# Patient Record
Sex: FEMALE | Race: WHITE | Employment: FULL TIME | URBAN - METROPOLITAN AREA
[De-identification: names, ages, dates, MRNs, and addresses within clinical notes are randomized per-mention and may not be internally consistent; named-entity substitution may affect disease eponyms.]

---

## 2018-05-17 PROBLEM — F98.8 ATTENTION DEFICIT DISORDER (ADD) IN ADULT: Status: ACTIVE | Noted: 2018-05-17

## 2022-05-13 ENCOUNTER — APPOINTMENT (OUTPATIENT)
Dept: OTHER | Facility: HOSPITAL | Age: 27
End: 2022-05-13
Attending: PREVENTIVE MEDICINE

## 2023-10-25 NOTE — H&P
[id number] [facility] Patient Status:  Outpatient    1995 MRN [id number]   Location [address]  Attending Kervin Constantino MD   Hosp Day # 0 PCP Unknown Pcp     Date of Admission:  10/25/2023      HPI:   Alice Serna is a 25year old  female, current EGA of 39w1d with an estimated date of delivery of: 10/31/2023, by Last Menstrual Period who presents due to  elevated BP in the office. Being admitted for induction of labor. Pt denies N/V/F/C/CP/SOB, HA, blurry vision, dizziness, RUQ pain, ctx, lof, VB. Pt was seen in the office today and was found to have /80-->repeat 160/98. She had elevated BP back at 28 weeks sandra 172/100 and at 30 weeks 160/92. Since she has had two BP that have been elevated >6 hrs apart, she is considered a GHTN. Her current obstetrical history is significant for ADHD, depression, Rh negative (received Rhogam 23) and GHTN. .        Patient Active Problem List:     Attention deficit disorder (ADD) in adult     Pregnancy        Fetal Movement reported as good. GBS negative. Rh negative. History   Obstetric History:   OB History    Para Term  AB Living   1 0 0 0 0     SAB IAB Ectopic Multiple Live Births   0 0 0          # Outcome Date GA Lbr Clifford/2nd Weight Sex Delivery Anes PTL Lv   1 Current                Gyne History:   Last pap smear: 3/24/2023: Negative cytology    Past Medical History:   Past Medical History:   Diagnosis Date    IUD (intrauterine device) in place 2018    Mirena-approimate date of placement (April-2018) in [facility])       Past Surgerical History:   Past Surgical History:   Procedure Laterality Date    WISDOM TEETH REMOVED         Social History: Social History    Tobacco Use      Smoking status: Not on file      Smokeless tobacco: Never    Alcohol use: Yes      Comment: occasionally       Allergies/Medications:    Allergies:     Latex HIVES  Latex                   HIVES  Sulfa Antibiotics       HIVES  Sulfa Antibiotics       HIVES    Comment:Systemic    Medications:  prenatal vitamin with DHA 27-0.8-228 MG Oral Cap, Take 1 capsule by mouth daily. , Disp: , Rfl:   docusate sodium 100 MG Oral Cap, Take 1 capsule (100 mg total) by mouth 2 (two) times daily. , Disp: , Rfl:   buPROPion HCl (WELLBUTRIN OR), Take by mouth., Disp: , Rfl:           Review of Systems:   As documented in HPI      Physical Exam:   Pulse:  [62-74] 71  BP: (122-130)/(72-80) 122/77    Constitutional: alert and cooperative in No distress    Abdomen: soft,  nontender, gravid    Vaginal exam: Dilation:closed     Effacement: thick %    Station: high    FHT assessment:   Baseline: 120 bpm   Variability: moderate   Accels:  present   Decels: No   Tocos:  irregular   Category: 1 tracing    Neurologic: Alert and oriented  Psychiatric: Cooperative    Results:     Recent Results (from the past 24 hour(s))   Comp Metabolic Panel (14)    Collection Time: 10/25/23  3:09 PM   Result Value Ref Range    Glucose 76 70 - 99 mg/dL    Sodium 138 136 - 145 mmol/L    Potassium 4.1 3.5 - 5.1 mmol/L    Chloride 107 98 - 112 mmol/L    CO2 23.0 21.0 - 32.0 mmol/L    Anion Gap 8 0 - 18 mmol/L    BUN 8 7 - 18 mg/dL    Creatinine 0.87 0.55 - 1.02 mg/dL    BUN/CREA Ratio 9.2 (L) 10.0 - 20.0    Calcium, Total 9.3 8.5 - 10.1 mg/dL    Calculated Osmolality 283 275 - 295 mOsm/kg    eGFR-Cr 92 >=60 mL/min/1.73m2    ALT 24 13 - 56 U/L    AST 25 15 - 37 U/L    Alkaline Phosphatase 129 (H) 37 - 98 U/L    Bilirubin, Total 0.3 0.1 - 2.0 mg/dL    Total Protein 6.3 (L) 6.4 - 8.2 g/dL    Albumin 2.8 (L) 3.4 - 5.0 g/dL    Globulin  3.5 2.8 - 4.4 g/dL    A/G Ratio 0.8 (L) 1.0 - 2.0    Patient Fasting for CMP?  No    Protein/Creatinine Ratio, Urine Random    Collection Time: 10/25/23  3:09 PM   Result Value Ref Range    Total Protein Urine Random <5.0 mg/dL    Creatinine Ur Random 20.00 mg/dL    Urine Protein/Creatinine Ratio, Random     CBC W/ DIFFERENTIAL    Collection Time: 10/25/23  3:09 PM   Result Value Ref Range    WBC 11.6 (H) 4.0 - 11.0 x10(3) uL    RBC 4.14 3.80 - 5.30 x10(6)uL    HGB 12.4 12.0 - 16.0 g/dL    HCT 36.0 35.0 - 48.0 %    MCV 87.0 80.0 - 100.0 fL    MCH 30.0 26.0 - 34.0 pg    MCHC 34.4 31.0 - 37.0 g/dL    RDW-SD 46.8 (H) 35.1 - 46.3 fL    RDW 14.9 11.0 - 15.0 %    .0 150.0 - 450.0 10(3)uL    Neutrophil Absolute Prelim 8.49 (H) 1.50 - 7.70 x10 (3) uL    Neutrophil Absolute 8.49 (H) 1.50 - 7.70 x10(3) uL    Lymphocyte Absolute 2.01 1.00 - 4.00 x10(3) uL    Monocyte Absolute 0.76 0.10 - 1.00 x10(3) uL    Eosinophil Absolute 0.09 0.00 - 0.70 x10(3) uL    Basophil Absolute 0.03 0.00 - 0.20 x10(3) uL    Immature Granulocyte Absolute 0.19 0.00 - 1.00 x10(3) uL    Neutrophil % 73.3 %    Lymphocyte % 17.4 %    Monocyte % 6.6 %    Eosinophil % 0.8 %    Basophil % 0.3 %    Immature Granulocyte % 1.6 %       No results found. Assessment/Plan:   IUP 39w1d admitted  for IOL    Obstetrical history significant for ADHD, depression, Rh negative (received Rhogam 23) and GHTN      Patient Active Problem List:     Attention deficit disorder (ADD) in adult     Pregnancy        Treatment Plan:  Admit for IOL    Panda Michelle is a 25year old  female, current EGA of 39w1d who presents for admission due to IOL    Risks, benefits, alternatives and possible complications have been discussed in detail with the patient. Pre-admission, admission, and post admission procedures and expectations were discussed in detail.     All questions answered; all appropriate consents will be signed at the [place] [id number] at 39w1d  Fetal heart tones category 1  IOL:  admit, routine labs, Cervix unfavorable; Cook's balloon for cervical ripening, epidural if patient desires  GBS negative  GHTN: labs overall wnl; no preeclamptic symptoms; since pt is 39 weeks and GHTN will proceed with IOL  CPM      Edi Petit Maddy De Leon MD  10/25/2023  4:07 PM

## 2023-10-25 NOTE — PROGRESS NOTES
Pt is a 25year old female admitted to TR4/TR4-A. Patient presents with:  R/o Preeclampsia: Pt sent from office for elevated BP's     Pt is  39w1d intra-uterine pregnancy. History obtained, consents signed. Oriented to room, staff, and plan of care.

## 2023-10-26 NOTE — PROGRESS NOTES
Limited OB Ultrasound ([id number])    FACILITY: [facility]  EXAMINATION DATE: 10/25/2023     Indication: position check  EGA: 39w1d     Findings  Number of gestations: navarrete  Presentation:  cephalic, OP  Fetal Cardiac Activity: present, 120 bpm  Placenta: posterior  FATOU: subjectively normal  Movement: Gross and fine movement visualized    Impression  Navarrete IUP in the cephalic position    Performed and Read By: Mercer Favia, MD Cooks Balloon Placement:  Discussed risks, benefits, and alternatives of induction of labor. Discussed methods used to induce pt into labor. Discussed induction of labor using Davis bulb balloon and how it works. Pt verbalized understanding. Pt was placed in lithotomy position with legs in stir-ups. speculum was placed in the introitus. The cervix was visualized. The Davis bulb catheter was placed per protocol. It slid in smoothly without resistance. The balloon was filled with 60ml of normal saline. Pt tolerated the procedure well. No complication were noted.

## 2023-10-26 NOTE — PLAN OF CARE
Problem: BIRTH - VAGINAL/ SECTION  Goal: Fetal and maternal status remain reassuring during the birth process  Description: INTERVENTIONS:  - Monitor vital signs  - Monitor fetal heart rate  - Monitor uterine activity  - Monitor labor progression (vaginal delivery)  - DVT prophylaxis (C/S delivery)  - Surgical antibiotic prophylaxis (C/S delivery)  Outcome: Progressing     Problem: PAIN - ADULT  Goal: Verbalizes/displays adequate comfort level or patient's stated pain goal  Description: INTERVENTIONS:  - Encourage pt to monitor pain and request assistance  - Assess pain using appropriate pain scale  - Administer analgesics based on type and severity of pain and evaluate response  - Implement non-pharmacological measures as appropriate and evaluate response  - Consider cultural and social influences on pain and pain management  - Manage/alleviate anxiety  - Utilize distraction and/or relaxation techniques  - Monitor for opioid side effects  - Notify MD/LIP if interventions unsuccessful or patient reports new pain  - Anticipate increased pain with activity and pre-medicate as appropriate  Outcome: Progressing     Problem: ANXIETY  Goal: Will report anxiety at manageable levels  Description: INTERVENTIONS:  - Administer medication as ordered  - Teach and rehearse alternative coping skills  - Provide emotional support with 1:1 interaction with staff  Outcome: Progressing     Problem: Patient Centered Care  Goal: Patient preferences are identified and integrated in the patient's plan of care  Description: Interventions:  - What would you like us to know as we care for you?  We're having a baby girl!  - Provide timely, complete, and accurate information to patient/family  - Incorporate patient and family knowledge, values, beliefs, and cultural backgrounds into the planning and delivery of care  - Encourage patient/family to participate in care and decision-making at the level they choose  - [place] patient and family perspectives and choices  Outcome: Progressing     Problem: Patient/Family Goals  Goal: Patient/Family Long Term Goal  Description: Patient's Long Term Goal: Maintain pregnancy    Interventions:  - Multidisciplinary approach to provide holistic care  - Provide disease specific education and reinforcement throughout hospitalization     Outcome: Progressing  Goal: Patient/Family Short Term Goal  Description: Patient's Short Term Goal: Understand anticipatory needs of  at this gestational age    Interventions:   - Neonatology consult   - Lactation consult    Outcome: Progressing

## 2023-10-27 NOTE — DISCHARGE SUMMARY
Admit dx: IUP 39 weeks, 1 days, elevated blood pressure in office, 10/25/23    Discharge dx: IUP 39 weeks, 3 days, normotensive, 10/27/23    Hospital course:  Patient admitted following elevated blood pressure in the office. She underwent induction of labor with placement of Davis balloon followed by pitocin. Cervix remained unfavorable, unable to safely perform artificial rupture of membranes. After two days of pitocin, situation reassessed. Please refer to progress note. As patient remained largely normotensive during her hospital stay, the necessity of induction of labor was reevaluated. Patient presented options. Patient opted for discharge home with close follow up. Patient with follow up appointment 10/30/23.

## 2023-10-27 NOTE — PLAN OF CARE
Problem: BIRTH - VAGINAL/ SECTION  Goal: Fetal and maternal status remain reassuring during the birth process  Description: INTERVENTIONS:  - Monitor vital signs  - Monitor fetal heart rate  - Monitor uterine activity  - Monitor labor progression (vaginal delivery)  - DVT prophylaxis (C/S delivery)  - Surgical antibiotic prophylaxis (C/S delivery)  Outcome: Progressing     Problem: PAIN - ADULT  Goal: Verbalizes/displays adequate comfort level or patient's stated pain goal  Description: INTERVENTIONS:  - Encourage pt to monitor pain and request assistance  - Assess pain using appropriate pain scale  - Administer analgesics based on type and severity of pain and evaluate response  - Implement non-pharmacological measures as appropriate and evaluate response  - Consider cultural and social influences on pain and pain management  - Manage/alleviate anxiety  - Utilize distraction and/or relaxation techniques  - Monitor for opioid side effects  - Notify MD/LIP if interventions unsuccessful or patient reports new pain  - Anticipate increased pain with activity and pre-medicate as appropriate  Outcome: Progressing     Problem: ANXIETY  Goal: Will report anxiety at manageable levels  Description: INTERVENTIONS:  - Administer medication as ordered  - Teach and rehearse alternative coping skills  - Provide emotional support with 1:1 interaction with staff  Outcome: Progressing     Problem: Patient Centered Care  Goal: Patient preferences are identified and integrated in the patient's plan of care  Description: Interventions:  - What would you like us to know as we care for you?  We're having a baby girl!  - Provide timely, complete, and accurate information to patient/family  - Incorporate patient and family knowledge, values, beliefs, and cultural backgrounds into the planning and delivery of care  - Encourage patient/family to participate in care and decision-making at the level they choose  - [place] patient and family perspectives and choices  Outcome: Progressing     Problem: Patient/Family Goals  Goal: Patient/Family Long Term Goal  Description: Patient's Long Term Goal: Maintain pregnancy    Interventions:  - Multidisciplinary approach to provide holistic care  - Provide disease specific education and reinforcement throughout hospitalization     Outcome: Progressing  Goal: Patient/Family Short Term Goal  Description: Patient's Short Term Goal: Understand anticipatory needs of  at this gestational age    Interventions:   - Neonatology consult   - Lactation consult    Outcome: Progressing

## 2023-10-27 NOTE — PROGRESS NOTES
Patient was seen earlier in the day, around 11am (see nursing note for exact time). SVE at that time 2 cm (at best)/ 70%/ -3, posterior  Unable to perform AROM secondary to the cervical opening being quite posterior and the presenting part being high in the pelvis (though engaged). Patient seen and reassessed her case. Patient now on 20 mIU/mL of pitocin, having some contractions, but patient still quite comfortable, clinically not in labor. Reviewed her blood pressures since admission. All but two of her blood pressures have been <140/90. She had a BP of 144/102 on 10/25/23 at 1630. At that time, patient states that she was anxious as she had just been told that she was being induced. She also had a BP on 10/26/23 at 1730 of 157/86. Patient states that that blood pressure was taken when patient had just returned from a brisk walk around the unit and was bouncing on a birthing ball. The patient denies headache, visual changes, or upper abdominal pain. Her preeclampsia labs were not suggestive of preeclampsia. Given the following:  - her induction was started secondary to elevated blood pressure  - her blood pressures have largely been normal except for two readings (both with extenuating circumstances that could explain elevated blood pressure)  - despite methods to ripen cervix and put patient in labor, she has not been in labor  - said methods to put patient in labor carry risks of failed induction and ultimate delivery by     Weighing risks of continuing the pregnancy vs continuing induction of labor, it's not clear that the patient has gestational hypertension and it's not clear that the patient needs to undergo induction of labor. Presented options to patient: continuing with induction of labor vs discharge home with home BP monitoring and close follow up. After discussing her case and the considerations in her case, patient has opted for discharge home.   As she had instrumentation of her cervix, will have patient start ampicillin 500 mg tid x five days. Will also have patient monitor blood pressure at home 3-4 times/day, reviewed BP parameters for which she should come back to L&D. Reviewed fetal kick counts, reviewed symptoms of uterine infection. Barring an indication for patient to be delivered, patient scheduled for follow up in three days on Monday, Oct 30th. Patient expressed understanding of above discussion. Fetal well being confirmed with category one NST. Vitals rechecked. Patient was discharged home.

## 2023-11-02 NOTE — L&D DELIVERY NOTE
Nakia Hauser, Girl [[id number]]      Labor Events     labor?: No   steroids?: None  Antibiotics received during labor?: No  Rupture date/time: 2023 0825     Rupture type: AROM  Fluid color: Clear  Labor type: Induced Onset of Labor  Induction: Cervical Ripening Balloon, Oxytocin, AROM  Indications for induction: Gestational Hypertension  Intrapartum & labor complications: None       Labor Length    1st stage: 3h 50m  2nd stage: 4h 10m  3rd stage: 0h 03m       Labor Event Times    Labor onset date/time: 2023 0910  Dilation complete date/time: 2023 1300  Start pushing date/time: 2023 1305       [place] Presentation    Presentation: Vertex  Position: Left Occiput Anterior       Operative Delivery    Operative Vaginal Delivery: No                      Shoulder Dystocia    Shoulder Dystocia: No             Anesthesia    Method: Epidural              [place] Delivery      Head delivery date/time: 2023 17:09:58   Delivery date/time:  23 17:10:12   Delivery type: Normal spontaneous vaginal delivery    Details:     Delivery location: delivery room  Delivery Room Temperature: 70       Delivery Providers    Delivering Clinician: Debbie Katz MD   Delivery personnel:  Provider Role   Demario Rush, RN Baby Nurse   Sergio Titus, RN Delivery Nurse   Chris Tavarez, RN Delivery Nurse             Cord    Vessels: 3 Vessels  Complications: None  Timed cord clamping: Yes  Time in sec: 60  Cord blood disposition: to lab  Gases sent?: No       Resuscitation    Method: None       [place] Measurements      Weight: 3700 g 8 lb 2.5 oz Length: 55.2 cm     Head circum. : 35 cm                      Placenta    Date/time: 2023 1714  Removal: Spontaneous  Appearance: Intact  Disposition: Discarded       Apgars    Living status: Living   Apgar Scoring Key:    0 1 2    Skin color Blue or pale Acrocyanotic Completely pink    Heart rate Absent <100 bpm >100 bpm    Reflex irritability No response Grimace Cry or active withdrawal    Muscle tone Limp Some flexion Active motion    Respiratory effort Absent Weak cry; hypoventilation Good, crying              1 Minute:  5 Minute:  10 Minute:  15 Minute:  20 Minute:      Skin color: 0  1       Heart rate: 2  2       Reflex irritablity: 2  2       Muscle tone: 2  2       Respiratory effort: 2  2       Total: 8  9               Skin to Skin    Skin to skin initiated date/time: 2023 1730  Skin to skin with:  Mother       Vaginal Count    Initial count RN: Royal Sampson RN  Initial count Tech: Volobuyev, Ilona   Sponges   Sharps    Initial counts 10   0    Final counts 10   3    Final count RN: Royal Sampson RN  Final count MD: Elo Serna MD       Delivery (Maternal)    Episiotomy: None  Perineal lacerations: 2nd Repaired?: Yes     Vaginal laceration?: No      Cervical laceration?: No    Clitoral laceration?: No    Quantitative blood loss (mL): [address]    Vaginal Delivery Note    You Smalls Patient Status:  Inpatient    1995 MRN [id number]   Location [address] [place] [address] Attending Celia Vogt MD   Hosp Day # 1 PCP Unknown Pcp     Delivery     Infant  Date of Delivery: 2023   Time of Delivery: 5:10 PM  Delivery Type: Normal spontaneous vaginal delivery    Infant Sex/Birthweight: female 8 lb 2.5 oz (3.7 kg)     Presentation Vertex [1]  Position Left [1] Occiput [1] Anterior [1]    Apgars:  1 minute: 8               5 minutes: 9                        10 minutes:      Placenta  Date/Time of Delivery: 2023  5:14 PM   Delivery: spontaneous  Placenta to Pathology: no  Cord Gases Submitted: no  Cord Blood Collection: no  Cord Tissue Collection: no  Cord Complications: none  Sponge and Needle Counts:  Verified yes    Maternal Anesthesia: epidural   Episiotomy/Laceration Repair  Episiotomy: none  Laceration: perineal 2 degree  Location: left  Suture Size/Type: 2-0 Chromic and 3-0 Chromic    Delivery Complications  none    Neonatologist Present: no  Delivery Comment: Patient complete and pushing in LDR with epidural.  Delivered JERMAIN head over intact perineum. No nuchal cord. Body delivered and placed on maternal abdomen. Cord clamped x2 and cut by father.  of intact 3v cord and placenta. Laceration repaired in routine manner. Cervix and rectum intact.         Intake/Output   Quantitative Blood Loss (mL) 500      Gwedgar Bautista MD   2023  5:32 PM

## 2023-11-02 NOTE — ANESTHESIA PROCEDURE NOTES
Labor Analgesia    Date/Time: 11/2/2023 9:48 AM    Performed by: John Sepulveda MD  Authorized by: John Sepulveda MD      General Information and Staff    Start Time:  11/2/2023 9:48 AM  End Time:  11/2/2023 10:04 AM  Anesthesiologist:  John Sepulveda MD  Performed by:   Anesthesiologist  Patient Location:  OB  Reason for Block: labor epidural    Preanesthetic Checklist: patient identified, IV checked, site marked, risks and benefits discussed, monitors and equipment checked, pre-op evaluation, timeout performed, anesthesia consent and sterile technique used      Procedure Details    Patient Position:  Sitting  Prep: ChloraPrep    Monitoring:  Heart rate  Approach:  Midline    Epidural Needle    Injection Technique:  CAITLYN air  Needle Type:  Tuohy  Needle Gauge:  18 G  Needle Length:  3.5 in  Location:  L2-3    Spinal Needle      Catheter    Catheter Type:  Multi-orifice  Catheter Size:  20 G  Test Dose:  Negative    Assessment    Sensory Level:  T6    Additional Comments     First pass motor intact

## 2023-11-02 NOTE — PROGRESS NOTES
[place] cervical balloon placement:    After reviewing procedure with patient, a speculum exam was performed, and the cervix was clearly visualized. The cervix was prepped with betadine. The [facility] cervical balloon was guided through the cervix into the uterine cavity. Once the uterine balloon was past the internal cervical os, the stylet was removed, and the catheter was further advanced until the vaginal balloon was past the external cervical os. The uterine balloon was inflated with 20 mL of saline. Gentle traction was placed on the catheter until the vaginal balloon was visible beyond the external cervical os. The speculum was removed, and the vaginal balloon was inflated with 40 mL of saline. Finally, both the uterine and vaginal balloons were inflated with saline until both balloons contained 80 mL of saline. Patient tolerated the procedure well.

## 2023-11-02 NOTE — PLAN OF CARE
Problem: BIRTH - VAGINAL/ SECTION  Goal: Fetal and maternal status remain reassuring during the birth process  Description: INTERVENTIONS:  - Monitor vital signs  - Monitor fetal heart rate  - Monitor uterine activity  - Monitor labor progression (vaginal delivery)  - DVT prophylaxis (C/S delivery)  - Surgical antibiotic prophylaxis (C/S delivery)  Outcome: Progressing     Problem: PAIN - ADULT  Goal: Verbalizes/displays adequate comfort level or patient's stated pain goal  Description: INTERVENTIONS:  - Encourage pt to monitor pain and request assistance  - Assess pain using appropriate pain scale  - Administer analgesics based on type and severity of pain and evaluate response  - Implement non-pharmacological measures as appropriate and evaluate response  - Consider cultural and social influences on pain and pain management  - Manage/alleviate anxiety  - Utilize distraction and/or relaxation techniques  - Monitor for opioid side effects  - Notify MD/LIP if interventions unsuccessful or patient reports new pain  - Anticipate increased pain with activity and pre-medicate as appropriate  Outcome: Progressing     Problem: ANXIETY  Goal: Will report anxiety at manageable levels  Description: INTERVENTIONS:  - Administer medication as ordered  - Teach and rehearse alternative coping skills  - Provide emotional support with 1:1 interaction with staff  Outcome: Progressing     Problem: Patient/Family Goals  Goal: Patient/Family Long Term Goal  Description: Patient's Long Term Goal: Patient state she would like to have a healthy and safe delivery. Interventions:  - Involved in plan of care. - Timely nursing assessment and education.  - See additional Care Plan goals for specific interventions  Outcome: Progressing  Goal: Patient/Family Short Term Goal  Description: Patient's Short Term Goal: Pt states she would like to have a success induction and delivery a baby girl.     Interventions:   - Timely nursing assessment   - Nursing assessment and education  - Pain management education  - See additional Care Plan goals for specific interventions  Outcome: Progressing     Problem: Patient Centered Care  Goal: Patient preferences are identified and integrated in the patient's plan of care  Description: Interventions:  - What would you like us to know as we care for you?  Patient states she would like to have a successful induction and deliver a healthy baby girl.   - Provide timely, complete, and accurate information to patient/family  - Incorporate patient and family knowledge, values, beliefs, and cultural backgrounds into the planning and delivery of care  - Encourage patient/family to participate in care and decision-making at the level they choose  - Honor patient and family perspectives and choices  Outcome: Progressing

## 2023-11-02 NOTE — PROGRESS NOTES
Pt is a 25year old female admitted to [place] [address]. Patient presents with:  Scheduled Induction: Term, elevated BP. Pt is  40w1d intra-uterine pregnancy. History obtained, consents signed. Oriented to room, staff, and plan of care.

## 2023-11-02 NOTE — H&P
[id number] [facility] Patient Status:  Inpatient    1995 MRN [id number]   Location [address] Attending Fallon Dominguez MD   [facility] Day # 0 PCP Unknown Pcp     Date of Admission:  2023      HPI:   Марина Goss is a 25year old  female, current EGA of 40w1d with an estimated date of delivery of: Estimated Date of Delivery: 10/31/23      Марина Goss is being admitted for induction of labor. Her current obstetrical history is significant for Rh Negative, gestational hypertension . Patient reports no complaints . Fetal Movement: normal.     History   Obstetric History:   OB History    Para Term  AB Living   1 0 0 0 0     SAB IAB Ectopic Multiple Live Births   0 0 0          # Outcome Date GA Lbr Clifford/2nd Weight Sex Delivery Anes PTL Lv   1 Current              Past Medical History:   Past Medical History:   Diagnosis Date    IUD (intrauterine device) in place 2018    Mirena-approimate date of placement (April-2018) in [facility] ([facility])     Past Social History:   Past Surgical History:   Procedure Laterality Date    WISDOM TEETH REMOVED       Family History:   Family History   Problem Relation Age of Onset    Other (pcos) Mother     Other (Other) Sister         PCOS    Heart Disease Father     Heart Attack Father      Social History: Social History    Tobacco Use      Smoking status: Former        Types: Cigarettes        Quit date: 2016        Years since quittin.8      Smokeless tobacco: Never    Alcohol use: Not Currently      Comment: occasionally       Allergies/Medications: Allergies:     Latex                   HIVES  Latex                   HIVES  Sulfa Antibiotics       HIVES  Sulfa Antibiotics       HIVES    Comment:Systemic  Medications:  prenatal vitamin with DHA 27-0.8-228 MG Oral Cap, Take 1 capsule by mouth daily. , Disp: , Rfl:         Review of Systems:   As documented in HPI    no complaints    Physical Exam:   Pulse:  [69-80] 69  BP: (146-148)/(81-90) 146/81    Constitutional: alert, appears stated age, and cooperative  Respiratory:  nonlabored  Cardiac: regular rate and rhythm  Abdomen: soft, nontender, nondistended, no abnormal masses, no epigastric pain and FHT present  Fetal Surveillance:  140 BPM; Fetal heart variability: moderate  Sterile vaginal exam:  1.5 cm/ 50%/ -3  Cervix: no lesions or cervical motion tenderness    Results:     Lab Results   Component Value Date    TREPONEMALAB Nonreactive 08/08/2023    RAPIDHIVSCRN Negative 08/08/2023    ABO A 10/25/2023    RH Negative 10/25/2023    WBC 11.6 (H) 10/25/2023    HGB 12.4 10/25/2023    HCT 36.0 10/25/2023    .0 10/25/2023    CREATSERUM 0.87 10/25/2023    BUN 8 10/25/2023     10/25/2023    K 4.1 10/25/2023     10/25/2023    CO2 23.0 10/25/2023    GLU 76 10/25/2023    CA 9.3 10/25/2023    ALB 2.8 (L) 10/25/2023    ALKPHO 129 (H) 10/25/2023    BILT 0.3 10/25/2023    TP 6.3 (L) 10/25/2023    AST 25 10/25/2023    ALT 24 10/25/2023       No results found for: \"DDIMER\", \"ESRML\", \"ESRPF\", \"CRP\", \"BNP\", \"MG\", \"PHOS\", \"TROP\", \"TROPHS\", \"CK\", \"CKMB\", \"ASH\", \"RPR\", \"B12\", \"ETOH\", \"COLORUR\", \"CLARITY\", \"SPECGRAVITY\", \"PROUR\", \"GLUUR\", \"KETUR\", \"BILUR\", \"BLOODURINE\", \"NITRITE\", \"UROBILINOGEN\", \"LEUUR\", \"UASA\", \"POCGLU\", \"BLDCUL\", \"BLDSMR\"    No results found. Assessment/Plan:    Federico Woodard is at an estimated gestational age of 44w3d with an estimated date of delivery of:  Estimated Date of Delivery: 10/31/23    Not in labor. Admission problem(s):    Pregnancy  Gestational hypertension    Plan placement of Cook cervical balloon        Risks, benefits, alternatives and possible complications have been discussed in detail with the patient. Pre-admission, admission, and post admission procedures and expectations were discussed in detail.     All questions answered, all appropriate consents will be signed at the Hospital. Admission is planned for today. Intervention: IV Pitocin induction.     Betty Ochoa MD  11/1/2023  9:17 PM

## 2023-11-03 NOTE — PROGRESS NOTES
Received patient from L&D RN, Vicky Martines, via wheelchair. ID bands verified and unit orientation discussed and plan of care agreed on. Vitals are stable and bleeding WNL. Patient is breastfeeding only at this time and breastfeeding well. All questions answered. Family at the bedside.

## 2023-11-03 NOTE — PLAN OF CARE
Problem: BIRTH - VAGINAL/ SECTION  Goal: Fetal and maternal status remain reassuring during the birth process  Description: INTERVENTIONS:  - Monitor vital signs  - Monitor fetal heart rate  - Monitor uterine activity  - Monitor labor progression (vaginal delivery)  - DVT prophylaxis (C/S delivery)  - Surgical antibiotic prophylaxis (C/S delivery)  Outcome: Progressing     Problem: PAIN - ADULT  Goal: Verbalizes/displays adequate comfort level or patient's stated pain goal  Description: INTERVENTIONS:  - Encourage pt to monitor pain and request assistance  - Assess pain using appropriate pain scale  - Administer analgesics based on type and severity of pain and evaluate response  - Implement non-pharmacological measures as appropriate and evaluate response  - Consider cultural and social influences on pain and pain management  - Manage/alleviate anxiety  - Utilize distraction and/or relaxation techniques  - Monitor for opioid side effects  - Notify MD/LIP if interventions unsuccessful or patient reports new pain  - Anticipate increased pain with activity and pre-medicate as appropriate  Outcome: Progressing     Problem: ANXIETY  Goal: Will report anxiety at manageable levels  Description: INTERVENTIONS:  - Administer medication as ordered  - Teach and rehearse alternative coping skills  - Provide emotional support with 1:1 interaction with staff  Outcome: Progressing     Problem: Patient/Family Goals  Goal: Patient/Family Long Term Goal  Description: Patient's Long Term Goal: Patient state she would like to have a healthy and safe delivery. Interventions:  - Involved in plan of care. - Timely nursing assessment and education.  - See additional Care Plan goals for specific interventions  Outcome: Progressing  Goal: Patient/Family Short Term Goal  Description: Patient's Short Term Goal: Pt states she would like to have a success induction and delivery a baby girl.     Interventions:   - Timely nursing assessment   - Nursing assessment and education  - Pain management education  - See additional Care Plan goals for specific interventions  Outcome: Progressing     Problem: Patient Centered Care  Goal: Patient preferences are identified and integrated in the patient's plan of care  Description: Interventions:  - What would you like us to know as we care for you? Patient states she would like to have a successful induction and deliver a healthy baby girl.   - Provide timely, complete, and accurate information to patient/family  - Incorporate patient and family knowledge, values, beliefs, and cultural backgrounds into the planning and delivery of care  - Encourage patient/family to participate in care and decision-making at the level they choose  - Honor patient and family perspectives and choices  Outcome: Progressing     Problem: POSTPARTUM  Goal: Long Term Goal:Experiences normal postpartum course  Description: INTERVENTIONS:  - Assess and monitor vital signs and lab values. - Assess fundus and lochia. - Provide ice/sitz baths for perineum discomfort. - Monitor healing of incision/episiotomy/laceration, and assess for signs and symptoms of infection and hematoma. - Assess bladder function and monitor for bladder distention.  - Provide/instruct/assist with pericare as needed. - Provide VTE prophylaxis as needed. - Monitor bowel function.  - Encourage ambulation and provide assistance as needed. - Assess and monitor emotional status and provide social service/psych resources as needed. - Utilize standard precautions and use personal protective equipment as indicated. Ensure aseptic care of all intravenous lines and invasive tubes/drains.  - Obtain immunization and exposure to communicable diseases history. Outcome: Progressing  Goal: Optimize infant feeding at the breast  Description: INTERVENTIONS:  - Initiate breast feeding within first hour after birth.    - Monitor effectiveness of current breast feeding efforts. - Assess support systems available to mother/family.  - Identify cultural beliefs/practices regarding lactation, letdown techniques, maternal food preferences. - Assess mother's knowledge and previous experience with breast feeding.  - Provide information as needed about early infant feeding cues (e.g., rooting, lip smacking, sucking fingers/hand) versus late cue of crying.  - Discuss/demonstrate breast feeding aids (e.g., infant sling, nursing footstool/pillows, and breast pumps). - Encourage mother/other family members to express feelings/concerns, and actively listen. - Educate father/SO about benefits of breast feeding and how to manage common lactation challenges. - Recommend avoidance of specific medications or substances incompatible with breast feeding.  - Assess and monitor for signs of nipple pain/trauma. - Instruct and provide assistance with proper latch. - Review techniques for milk expression (breast pumping) and storage of breast milk. Provide pumping equipment/supplies, instructions and assistance, as needed. - Encourage rooming-in and breast feeding on demand.  - Encourage skin-to-skin contact. - Provide LC support as needed. - Assess for and manage engorgement. - Provide breast feeding education handouts and information on community breast feeding support. Outcome: Progressing  Goal: Establishment of adequate milk supply with medication/procedure interruptions  Description: INTERVENTIONS:  - Review techniques for milk expression (breast pumping). - Provide pumping equipment/supplies, instructions, and assistance until it is safe to breastfeed infant. Outcome: Progressing  Goal: Experiences normal breast weaning course  Description: INTERVENTIONS:  - Assess for and manage engorgement. - Instruct on breast care. - Provide comfort measures.   Outcome: Progressing  Goal: Appropriate maternal -  bonding  Description: INTERVENTIONS:  - Assess caregiver- interactions. - Assess caregiver's emotional status and coping mechanisms. - Encourage caregiver to participate in  daily care. - Assess support systems available to mother/family.  - Provide /case management support as needed. Outcome: Progressing   Sat with parents to update them on plan of care. Educated about SIDS. Encouraged skin to skin and feeding on demand. Encouraged safe sleeping practices. Assisted with breastfeeding and diaper changes. Encouraged parent to continue to document intake and output.

## 2023-11-03 NOTE — PROGRESS NOTES
Patient up to bathroom with assist x 2. Unable to void at this time. Patient transferred to mother/baby room 354 per wheelchair in stable condition with baby and personal belongings. Accompanied by significant other and staff. Report given to mother/baby RN, Jerrica Simeon.

## 2023-11-03 NOTE — LACTATION NOTE
LACTATION NOTE - MOTHER      Evaluation Type: Inpatient    Problems identified  Problems identified: Knowledge deficit    Maternal history  Other/comment: ADD    Breastfeeding goal  Breastfeeding goal: To maintain breast milk feeding per patient goal    Maternal Assessment  Bilateral Breasts: Soft;Symmetrical  Bilateral Nipples: Slightly everted/short;Flat  Prior breastfeeding experience (comment below): Primip  Breastfeeding Assistance: Breastfeeding assistance provided with permission    Pain assessment  Location/Comment: denies  Treatment of Sore Nipples: Mandi                     Mother was breastfeeding as I entered the room. Deep latch observed. Made minor positioning suggestions. Encouraged STS. Discussed hand expression and spoon feeding if the infant is too sleepy to nurse. Discussed normal NB behavior. Educated patient about supply/demand and the importance of frequent stimulation. Encouraged to call [facility] if assistance with breastfeeding is needed.

## 2023-11-03 NOTE — LACTATION NOTE
This note was copied from a baby's chart. LACTATION NOTE - INFANT    Evaluation Type  Evaluation Type: Inpatient    Problems & Assessment  Problems Diagnosed or Identified: Sleepy  Infant Assessment: Hunger cues present  Muscle tone: Appropriate for GA    Feeding Assessment  Summary Current Feeding: Breastfeeding exclusively  Breastfeeding Assessment: Assisted with breastfeeding w/mother's permission;Sustained nutrititive latch w/audible swallows; Calm and ready to breastfeed;Deep latch achieved and observed; Coordinated suck/swallow  Breastfeeding Positions: cross cradle;cradle;right breast;left breast  Latch: Repeated attempts, hold nipple in mouth, stimulate to suck  Audible Sucks/Swallows:  A few with stimulation  Type of Nipple: Everted (after stimulation)  Comfort (Breast/Nipple): Soft/non-tender  Hold (Positioning): Full assist, teach one side, mother does other, staff holds  [facility] Score: 7

## 2023-11-03 NOTE — PLAN OF CARE
Problem: BIRTH - VAGINAL/ SECTION  Goal: Fetal and maternal status remain reassuring during the birth process  Description: INTERVENTIONS:  - Monitor vital signs  - Monitor fetal heart rate  - Monitor uterine activity  - Monitor labor progression (vaginal delivery)  - DVT prophylaxis (C/S delivery)  - Surgical antibiotic prophylaxis (C/S delivery)  Outcome: Progressing     Problem: PAIN - ADULT  Goal: Verbalizes/displays adequate comfort level or patient's stated pain goal  Description: INTERVENTIONS:  - Encourage pt to monitor pain and request assistance  - Assess pain using appropriate pain scale  - Administer analgesics based on type and severity of pain and evaluate response  - Implement non-pharmacological measures as appropriate and evaluate response  - Consider cultural and social influences on pain and pain management  - Manage/alleviate anxiety  - Utilize distraction and/or relaxation techniques  - Monitor for opioid side effects  - Notify MD/LIP if interventions unsuccessful or patient reports new pain  - Anticipate increased pain with activity and pre-medicate as appropriate  Outcome: Progressing     Problem: ANXIETY  Goal: Will report anxiety at manageable levels  Description: INTERVENTIONS:  - Administer medication as ordered  - Teach and rehearse alternative coping skills  - Provide emotional support with 1:1 interaction with staff  Outcome: Progressing     Problem: Patient/Family Goals  Goal: Patient/Family Long Term Goal  Description: Patient's Long Term Goal: Patient state she would like to have a healthy and safe delivery. Interventions:  - Involved in plan of care. - Timely nursing assessment and education.  - See additional Care Plan goals for specific interventions  Outcome: Progressing  Goal: Patient/Family Short Term Goal  Description: Patient's Short Term Goal: Pt states she would like to have a success induction and delivery a baby girl.     Interventions:   - Timely nursing assessment   - Nursing assessment and education  - Pain management education  - See additional Care Plan goals for specific interventions  Outcome: Progressing     Problem: Patient Centered Care  Goal: Patient preferences are identified and integrated in the patient's plan of care  Description: Interventions:  - What would you like us to know as we care for you? Patient states she would like to have a successful induction and deliver a healthy baby girl.   - Provide timely, complete, and accurate information to patient/family  - Incorporate patient and family knowledge, values, beliefs, and cultural backgrounds into the planning and delivery of care  - Encourage patient/family to participate in care and decision-making at the level they choose  - Honor patient and family perspectives and choices  Outcome: Progressing     Problem: POSTPARTUM  Goal: Long Term Goal:Experiences normal postpartum course  Description: INTERVENTIONS:  - Assess and monitor vital signs and lab values. - Assess fundus and lochia. - Provide ice/sitz baths for perineum discomfort. - Monitor healing of incision/episiotomy/laceration, and assess for signs and symptoms of infection and hematoma. - Assess bladder function and monitor for bladder distention.  - Provide/instruct/assist with pericare as needed. - Provide VTE prophylaxis as needed. - Monitor bowel function.  - Encourage ambulation and provide assistance as needed. - Assess and monitor emotional status and provide social service/psych resources as needed. - Utilize standard precautions and use personal protective equipment as indicated. Ensure aseptic care of all intravenous lines and invasive tubes/drains.  - Obtain immunization and exposure to communicable diseases history. Outcome: Progressing  Goal: Optimize infant feeding at the breast  Description: INTERVENTIONS:  - Initiate breast feeding within first hour after birth.    - Monitor effectiveness of current breast feeding efforts. - Assess support systems available to mother/family.  - Identify cultural beliefs/practices regarding lactation, letdown techniques, maternal food preferences. - Assess mother's knowledge and previous experience with breast feeding.  - Provide information as needed about early infant feeding cues (e.g., rooting, lip smacking, sucking fingers/hand) versus late cue of crying.  - Discuss/demonstrate breast feeding aids (e.g., infant sling, nursing footstool/pillows, and breast pumps). - Encourage mother/other family members to express feelings/concerns, and actively listen. - Educate father/SO about benefits of breast feeding and how to manage common lactation challenges. - Recommend avoidance of specific medications or substances incompatible with breast feeding.  - Assess and monitor for signs of nipple pain/trauma. - Instruct and provide assistance with proper latch. - Review techniques for milk expression (breast pumping) and storage of breast milk. Provide pumping equipment/supplies, instructions and assistance, as needed. - Encourage rooming-in and breast feeding on demand.  - Encourage skin-to-skin contact. - Provide LC support as needed. - Assess for and manage engorgement. - Provide breast feeding education handouts and information on community breast feeding support. Outcome: Progressing  Goal: Establishment of adequate milk supply with medication/procedure interruptions  Description: INTERVENTIONS:  - Review techniques for milk expression (breast pumping). - Provide pumping equipment/supplies, instructions, and assistance until it is safe to breastfeed infant. Outcome: Progressing  Goal: Experiences normal breast weaning course  Description: INTERVENTIONS:  - Assess for and manage engorgement. - Instruct on breast care. - Provide comfort measures.   Outcome: Progressing  Goal: Appropriate maternal -  bonding  Description: INTERVENTIONS:  - Assess caregiver- interactions. - Assess caregiver's emotional status and coping mechanisms. - Encourage caregiver to participate in  daily care. - Assess support systems available to mother/family.  - Provide /case management support as needed. Outcome: Progressing    Pain controlled with Motrin. Voiding freely. Fundus firm and midline. Bleeding WDL. Up ad noemi. Diet tolerated. Interacting appropriately with infant. Will continue plan of care.

## 2023-11-04 NOTE — PLAN OF CARE
Problem: BIRTH - VAGINAL/ SECTION  Goal: Fetal and maternal status remain reassuring during the birth process  Description: INTERVENTIONS:  - Monitor vital signs  - Monitor fetal heart rate  - Monitor uterine activity  - Monitor labor progression (vaginal delivery)  - DVT prophylaxis (C/S delivery)  - Surgical antibiotic prophylaxis (C/S delivery)  Outcome: Progressing     Problem: ANXIETY  Goal: Will report anxiety at manageable levels  Description: INTERVENTIONS:  - Administer medication as ordered  - Teach and rehearse alternative coping skills  - Provide emotional support with 1:1 interaction with staff  Outcome: Progressing     Problem: Patient/Family Goals  Goal: Patient/Family Long Term Goal  Description: Patient's Long Term Goal: Patient state she would like to have a healthy and safe delivery. Interventions:  - Involved in plan of care. - Timely nursing assessment and education.  - See additional Care Plan goals for specific interventions  Outcome: Progressing  Goal: Patient/Family Short Term Goal  Description: Patient's Short Term Goal: Pt states she would like to have a success induction and delivery a baby girl. Interventions:   - Timely nursing assessment   - Nursing assessment and education  - Pain management education  - See additional Care Plan goals for specific interventions  Outcome: Progressing     Problem: Patient Centered Care  Goal: Patient preferences are identified and integrated in the patient's plan of care  Description: Interventions:  - What would you like us to know as we care for you?  Patient states she would like to have a successful induction and deliver a healthy baby girl.   - Provide timely, complete, and accurate information to patient/family  - Incorporate patient and family knowledge, values, beliefs, and cultural backgrounds into the planning and delivery of care  - Encourage patient/family to participate in care and decision-making at the level they choose  - Honor patient and family perspectives and choices  Outcome: Progressing     Problem: POSTPARTUM  Goal: Long Term Goal:Experiences normal postpartum course  Description: INTERVENTIONS:  - Assess and monitor vital signs and lab values. - Assess fundus and lochia. - Provide ice/sitz baths for perineum discomfort. - Monitor healing of incision/episiotomy/laceration, and assess for signs and symptoms of infection and hematoma. - Assess bladder function and monitor for bladder distention.  - Provide/instruct/assist with pericare as needed. - Provide VTE prophylaxis as needed. - Monitor bowel function.  - Encourage ambulation and provide assistance as needed. - Assess and monitor emotional status and provide social service/psych resources as needed. - Utilize standard precautions and use personal protective equipment as indicated. Ensure aseptic care of all intravenous lines and invasive tubes/drains.  - Obtain immunization and exposure to communicable diseases history. Outcome: Progressing  Goal: Optimize infant feeding at the breast  Description: INTERVENTIONS:  - Initiate breast feeding within first hour after birth. - Monitor effectiveness of current breast feeding efforts. - Assess support systems available to mother/family.  - Identify cultural beliefs/practices regarding lactation, letdown techniques, maternal food preferences. - Assess mother's knowledge and previous experience with breast feeding.  - Provide information as needed about early infant feeding cues (e.g., rooting, lip smacking, sucking fingers/hand) versus late cue of crying.  - Discuss/demonstrate breast feeding aids (e.g., infant sling, nursing footstool/pillows, and breast pumps). - Encourage mother/other family members to express feelings/concerns, and actively listen. - Educate father/SO about benefits of breast feeding and how to manage common lactation challenges.   - Recommend avoidance of specific medications or substances incompatible with breast feeding.  - Assess and monitor for signs of nipple pain/trauma. - Instruct and provide assistance with proper latch. - Review techniques for milk expression (breast pumping) and storage of breast milk. Provide pumping equipment/supplies, instructions and assistance, as needed. - Encourage rooming-in and breast feeding on demand.  - Encourage skin-to-skin contact. - Provide LC support as needed. - Assess for and manage engorgement. - Provide breast feeding education handouts and information on community breast feeding support. Outcome: Progressing  Goal: Establishment of adequate milk supply with medication/procedure interruptions  Description: INTERVENTIONS:  - Review techniques for milk expression (breast pumping). - Provide pumping equipment/supplies, instructions, and assistance until it is safe to breastfeed infant. Outcome: Progressing  Goal: Appropriate maternal -  bonding  Description: INTERVENTIONS:  - Assess caregiver- interactions. - Assess caregiver's emotional status and coping mechanisms. - Encourage caregiver to participate in  daily care. - Assess support systems available to mother/family.  - Provide /case management support as needed.   Outcome: Progressing

## 2025-03-19 NOTE — H&P
[facility]  part of [facility]    History & Physical    Lisa Gore Patient Status:  Inpatient    1995 MRN [id number]   Location [facility] B[facility]TH [facility] Attending Bibi Garcia MD   Hosp Day # 0 PCP Unknown Pcp     Date of Admission:  3/19/2025    Assessment/Plan:     Lisa Gore is a 29 year old  female at 40w5d who presents to Labor and Delivery for scheduled induction of labor.    - Admit to Labor and Delivery  - Rh negative / GBS negative / Rubella Immune  - Admission labs pending  - Labor Induction/Augmentation plan: cook cervical balloon > pitocin  - Anesthesia consulted, epidural prn  - CFM    - Pregnancy complicated by: obesity, late term      HPI:   Lisa Gore is a 29 year old  female at 40w5d EGA who presents to Labor and Delivery for scheduled induction of labor. She reports feeling well, rare cramping, no true contractions.    Her current obstetrical history is significant for:    Patient Active Problem List   Diagnosis    Attention deficit disorder (ADD) in adult    Pregnancy (HCC)    Vaginal delivery (HCC)           History   Obstetric History:   OB History    Para Term  AB Living   2 1 1 0 0 1   SAB IAB Ectopic Multiple Live Births   0 0 0 0 1      # Outcome Date GA Lbr Clifford/2nd Weight Sex Type Anes PTL Lv   2 Current            1 Term 23 40w2d 03:50 / 04:10 8 lb 2.5 oz (3.7 kg) F NORMAL SPONT EPI N SHEILA       Past Medical History:   Past Medical History:    Gestational hypertension (HCC)    IUD (intrauterine device) in place    Mirena-approimate date of placement (April-2018) in [place]       Past Surgerical History:   Past Surgical History:   Procedure Laterality Date    [place] teeth removed         Social History:   Social History     Tobacco Use    Smoking status: Former     Current packs/day: 0.00     Types: Cigarettes     Quit date: 2016     Years since quittin.2    Smokeless tobacco: Never    Substance Use Topics    Alcohol use: Not Currently     Comment: occasionally        Allergies/Medications:   Allergies:   Allergies[1]    Medications:  Prescriptions Prior to Admission[2]      Review of Systems:   Pertinent positives noted in HPI      Physical Exam:        General: alert and oriented, no acute distress  Abdomen: soft,  nontender, gravid  Cervix: 1/50/-3  FHT: 140 / moderate variability / positive accelerations / no decelerations  Extremities: normal range of motion  Psychiatric: appropriate    Cervical Cook Balloon Procedure  Cervical Cook balloon placed in normal sterile fashion. Uterine and vaginal balloons insufflated with 60cc and 60cc normal saline respectively. Patient tolerated procedure well.        Bibi Garcia MD  3/19/2025  5:29 PM         [1]   Allergies  Allergen Reactions    Latex HIVES    Latex HIVES    Sulfa Antibiotics HIVES    Sulfa Antibiotics HIVES     Systemic   [2]   Medications Prior to Admission   Medication Sig Dispense Refill Last Dose/Taking    acetaminophen 500 MG Oral Tab Take 1 tablet (500 mg total) by mouth every 6 (six) hours as needed. 20 tablet 0     prenatal vitamin with DHA 27-0.8-228 MG Oral Cap Take 1 capsule by mouth daily.

## 2025-03-20 NOTE — ANESTHESIA PREPROCEDURE EVALUATION
Anesthesia PreOp Note    HPI:     Lisa Gore is a 29 year old female who presents for preoperative consultation requested by: * No surgeons listed *    Date of Surgery: 3/20/2025    * No procedures listed *  Indication: * No pre-op diagnosis entered *    Relevant Problems   No relevant active problems       NPO:                         History Review:  Patient Active Problem List    Diagnosis Date Noted    Vaginal delivery (HCC) 2023    Pregnancy (HCC) 10/25/2023    Attention deficit disorder (ADD) in adult 2018       Past Medical History:    Gestational hypertension (HCC)    IUD (intrauterine device) in place    Mirena-approimate date of placement (April-2018) in [place]       Past Surgical History:   Procedure Laterality Date    [place] teeth removed  2013       Prescriptions Prior to Admission[1]  Current Medications and Prescriptions Ordered in Epic[2]    Allergies[3]    Family History   Problem Relation Age of Onset    Other (pcos) Mother     Other (Other) Sister         PCOS    Heart Disease Father     Heart Attack Father      Social History     Socioeconomic History    Marital status:    Tobacco Use    Smoking status: Former     Current packs/day: 0.00     Types: Cigarettes     Quit date: 2016     Years since quittin.2    Smokeless tobacco: Never   Vaping Use    Vaping status: Never Used   Substance and Sexual Activity    Alcohol use: Not Currently     Comment: occasionally    Drug use: Not Currently     Types: Cannabis     Comment: occasionally    Sexual activity: Yes     Partners: Male     Birth control/protection: I.U.D.       Available pre-op labs reviewed.  Lab Results   Component Value Date    WBC 9.7 2025    RBC 4.11 2025    HGB 12.8 2025    HCT 35.6 2025    MCV 86.6 2025    MCH 31.1 2025    MCHC 36.0 2025    RDW 14.0 2025    .0 2025     Lab Results   Component Value Date     2025    K 3.7  03/19/2025     03/19/2025    CO2 20.0 (L) 03/19/2025    BUN 8 (L) 03/19/2025    CREATSERUM 0.66 03/19/2025    GLU 78 03/19/2025    CA 8.5 (L) 03/19/2025          Vital Signs:  Body mass index is 39.22 kg/m².   weight is 110.2 kg (243 lb). Her oral temperature is 98.4 °F (36.9 °C). Her blood pressure is 126/73 and her pulse is 65. Her respiration is 16.   Vitals:    03/19/25 2115 03/20/25 0220 03/20/25 0715 03/20/25 1145   BP: 134/75 126/90 105/45 126/73   Pulse: 69 77 63 65   Resp: 16 16 16    Temp: 96.9 °F (36.1 °C) 97.5 °F (36.4 °C) 98.3 °F (36.8 °C) 98.4 °F (36.9 °C)   TempSrc: Axillary Oral Oral Oral   Weight:            Anesthesia Evaluation      Airway   Mallampati: III  TM distance: >3 FB  Neck ROM: full  Dental - Dentition appears grossly intact     Pulmonary - normal exam   (-) asthma  Cardiovascular - normal exam  (+) hypertension    Neuro/Psych      GI/Hepatic/Renal    (-) GERD    Endo/Other    (-) diabetes mellitus  Abdominal   (+) obese                 Anesthesia Plan:   ASA:  2  Plan:   Epidural  Informed Consent Plan and Risks Discussed With:  Patient  Consent Comment: 30 yo F requesting a labor epidural. She denies history of coagulopathy, not on blood thinners, no previous back surgery, spina bifida or scoliosis. Platelets are 176.     I have informed patient of the risks of neuraxial anesthesia including, but not limited to: failure, headache, backache, hematoma, unilateral/patchy block, difficulty breathing, infection, bleeding, nerve damage, paralysis, death. The patient desires the proposed neuraxial anesthetic as planned.     All anesthetic questions answered.        I have informed Lisa Gore and/or legal guardian or family member of the nature of the anesthetic plan, benefits, risks including possible dental damage if relevant, major complications, and any alternative forms of anesthetic management.   All of the patient's questions were answered to the best of my ability. The patient  desires the anesthetic management as planned.  Reynaldo Rivera DO  3/20/2025 1:18 PM  Present on Admission:  **None**           [1]   Medications Prior to Admission   Medication Sig Dispense Refill Last Dose/Taking    prenatal vitamin with DHA 27-0.8-228 MG Oral Cap Take 1 capsule by mouth daily.   3/18/2025    acetaminophen 500 MG Oral Tab Take 1 tablet (500 mg total) by mouth every 6 (six) hours as needed. 20 tablet 0    [2]   Current Facility-Administered Medications Ordered in Epic   Medication Dose Route Frequency Provider Last Rate Last Admin    lactated ringers IV bolus 1,000 mL  1,000 mL Intravenous Once Samantha Blanc MD        fentaNYL-bupivacaine 2 mcg/mL-0.125% in sodium chloride 0.9% 100 mL EPIDURAL infusion premix   Epidural Continuous Samantha Blanc MD        fentaNYL (Sublimaze) 50 mcg/mL injection 100 mcg  100 mcg Epidural Once Samantha Blanc MD        bupivacaine PF (Marcaine) 0.25% injection  20 mL Epidural Once Samantha Blanc MD        ePHEDrine (PF) 25 MG/5 ML injection 5 mg  5 mg Intravenous PRN Samantha Blanc MD        nalbuphine (Nubain) 10 mg/mL injection 2.5 mg  2.5 mg Intravenous Q15 Min PRN Samantha Blanc MD        dextrose in lactated ringers 5% infusion   Intravenous Continuous Bibi Garcia MD        lactated ringers infusion   Intravenous PRN Bibi Garcia  mL/hr at 03/20/25 1000 New Bag at 03/20/25 1000    lactated ringers IV bolus 500 mL  500 mL Intravenous PRN Bibi Garcia MD        acetaminophen (Tylenol Extra Strength) tab 500 mg  500 mg Oral Q6H PRN Bibi Garcia MD        acetaminophen (Tylenol Extra Strength) tab 1,000 mg  1,000 mg Oral Q6H PRN Bibi Garcia MD        ibuprofen (Motrin) tab 600 mg  600 mg Oral Once PRN Bibi Garcia MD        ondansetron (Zofran) 4 MG/2ML injection 4 mg  4 mg Intravenous Q6H PRN Bibi Garcia MD        oxyTOCIN in sodium chloride 0.9% (Pitocin) 30 Units/500mL infusion premix  62.5-900 lani-units/min Intravenous  Continuous Bibi Garcia MD        terbutaline (Brethine) 1 MG/ML injection 0.25 mg  0.25 mg Subcutaneous PRN Bibi Garcia MD        sodium citrate-citric acid (Bicitra) 500-334 MG/5ML oral solution 30 mL  30 mL Oral PRN Bibi Garcia MD        lidocaine PF (Xylocaine-MPF) 1% injection  30 mL Intradermal Once Bibi Garcia MD        nalbuphine (Nubain) 10 mg/mL injection 10 mg  10 mg Intramuscular Q6H PRN Bibi Garcia MD        And    hydrOXYzine 50 mg/mL injection 50 mg  50 mg Intramuscular Q6H PRN Bibi Garcia MD        fentaNYL (Sublimaze) 50 mcg/mL injection 100 mcg  100 mcg Intravenous Once Bibi Garcia MD        fentaNYL (Sublimaze) 50 mcg/mL injection 50 mcg  50 mcg Intravenous Q30 Min PRN Bibi Gacria MD        oxyTOCIN in sodium chloride 0.9% (Pitocin) 30 Units/500mL infusion premix  0.5-20 lani-units/min Intravenous Continuous Bibi Garcia MD 16 mL/hr at 03/20/25 0807 16 lani-units/min at 03/20/25 0807     No current Epic-ordered outpatient medications on file.   [3]   Allergies  Allergen Reactions    Latex HIVES    Latex HIVES    Sulfa Antibiotics HIVES    Sulfa Antibiotics HIVES     Systemic

## 2025-03-20 NOTE — PROGRESS NOTES
SVE 4/ 80/ -03  Patient to get epidural then AROM.  FHT showed normal baseline, moderate variability, accelerations noted.  No decelerations.  Category one tracing.  Nelson q3-4 min.

## 2025-03-20 NOTE — ANESTHESIA PROCEDURE NOTES
Labor Analgesia    Date/Time: 3/20/2025 1:37 PM    Performed by: Reynaldo Rivera DO  Authorized by: Reynaldo Rivera DO      General Information and Staff    Start Time:  3/20/2025 1:37 PM  End Time:  3/20/2025 1:41 PM  Anesthesiologist:  Reynaldo Rivera DO  Performed by:  Anesthesiologist  Patient Location:  OB  Reason for Block: labor epidural    Preanesthetic Checklist: patient identified, IV checked, site marked, risks and benefits discussed, monitors and equipment checked, pre-op evaluation, timeout performed, anesthesia consent and sterile technique used      Procedure Details    Patient Position:  Sitting  Prep: ChloraPrep    Monitoring:  Heart rate  Approach:  Midline    Epidural Needle    Injection Technique:  CAITLYN saline  Needle Type:  Tuohy  Needle Gauge:  18 G  Needle Length:  3.5 in  Location:  L3-4    Spinal Needle    Needle Type:   Needle Insertion Depth:  7    Catheter    Catheter Type:  Multi-orifice  Catheter Size:  20 G  Catheter at Skin Depth:  12  Test Dose:  Negative    Assessment      Additional Comments

## 2025-03-21 NOTE — DISCHARGE SUMMARY
[facility]  part of [facility]    Obstetrical Discharge Summary    Lisa Gore Patient Status:  Inpatient    1995 MRN [id number]   Location [facility] 3SE Attending Bibi Garcia MD   Hosp Day # 2 PCP Unknown Pcp     Date of Admission: 3/19/2025     Date of Discharge: 3/21/2025    Admitting Diagnosis: pregnancy  Pregnancy (HCC)    Discharge Diagnosis: .Active Problems:    * No active hospital problems. *      Disposition: home    Hospital Course:   Reason for Admission:  Lisa Gore is a 29 year old  who was admitted with Estimated Date of Delivery: 3/14/25. She presented for induction of labor, obesity in pregnancy.       Hospital Course: Pt underwent a spontaneous vaginal. Refer to delivery note/ operative report for details.  Pt was transferred to mother/ baby.  She underwent an uncomplicated postpartum course.  Pt is being discharged today.    Date of Delivery: 3/20/2025   Time of Delivery: 6:35 PM  Delivery Type: Normal spontaneous vaginal delivery    Live   Information for the patient's :  Abimael Gore [[id number]]   male infant   Information for the patient's :  Abimael Gore [[id number]]   8 lb 14.9 oz (4.05 kg)   Apgars:  1 minute: 8               5 minutes: 9                        10 minutes:        Postpartum Course: Her postpartum course was uncomplicated     Results:   Recent Results (from the past 2 weeks)   CBC With Differential With Platelet    Collection Time: 25  6:19 PM   Result Value Ref Range    WBC 9.7 4.0 - 11.0 x10(3) uL    RBC 4.11 3.80 - 5.30 x10(6)uL    HGB 12.8 12.0 - 16.0 g/dL    HCT 35.6 35.0 - 48.0 %    MCV 86.6 80.0 - 100.0 fL    MCH 31.1 26.0 - 34.0 pg    MCHC 36.0 31.0 - 37.0 g/dL    RDW-SD 43.9 35.1 - 46.3 fL    RDW 14.0 11.0 - 15.0 %    .0 150.0 - 450.0 10(3)uL    Neutrophil Absolute Prelim 6.94 1.50 - 7.70 x10 (3) uL    Neutrophil Absolute 6.94 1.50 - 7.70 x10(3) uL    Lymphocyte Absolute 1.88  1.00 - 4.00 x10(3) uL    Monocyte Absolute 0.73 0.10 - 1.00 x10(3) uL    Eosinophil Absolute 0.08 0.00 - 0.70 x10(3) uL    Basophil Absolute 0.03 0.00 - 0.20 x10(3) uL    Immature Granulocyte Absolute 0.07 0.00 - 1.00 x10(3) uL    Neutrophil % 71.4 %    Lymphocyte % 19.3 %    Monocyte % 7.5 %    Eosinophil % 0.8 %    Basophil % 0.3 %    Immature Granulocyte % 0.7 %   Comp Metabolic Panel (14)    Collection Time: 03/19/25  6:19 PM   Result Value Ref Range    Glucose 78 70 - 99 mg/dL    Sodium 137 136 - 145 mmol/L    Potassium 3.7 3.5 - 5.1 mmol/L    Chloride 106 98 - 112 mmol/L    CO2 20.0 (L) 21.0 - 32.0 mmol/L    Anion Gap 11 0 - 18 mmol/L    BUN 8 (L) 9 - 23 mg/dL    Creatinine 0.66 0.55 - 1.02 mg/dL    BUN/CREA Ratio 12.1 10.0 - 20.0    Calcium, Total 8.5 (L) 8.7 - 10.4 mg/dL    Calculated Osmolality 281 275 - 295 mOsm/kg    eGFR-Cr 120 >=60 mL/min/1.73m2    ALT 8 (L) 10 - 49 U/L    AST 16 <34 U/L    Alkaline Phosphatase 111 (H) 37 - 98 U/L    Bilirubin, Total 0.4 0.3 - 1.2 mg/dL    Total Protein 6.2 5.7 - 8.2 g/dL    Albumin 3.8 3.2 - 4.8 g/dL    Globulin  2.4 2.0 - 3.5 g/dL    A/G Ratio 1.6 1.0 - 2.0   T Pallidum Screening Cascade    Collection Time: 03/19/25  6:19 PM   Result Value Ref Range    Treponemal Antibodies Nonreactive Nonreactive    ABORH (Blood Type)    Collection Time: 03/19/25  6:19 PM   Result Value Ref Range    ABO BLOOD TYPE A     RH BLOOD TYPE Negative    Antibody Screen    Collection Time: 03/19/25  6:19 PM   Result Value Ref Range    Antibody Screen Negative    Fetal Screen    Collection Time: 03/20/25  7:27 PM   Result Value Ref Range    Fetal Screen Result Negative    CBC With Differential With Platelet    Collection Time: 03/21/25  6:17 AM   Result Value Ref Range    WBC 12.0 (H) 4.0 - 11.0 x10(3) uL    RBC 3.60 (L) 3.80 - 5.30 x10(6)uL    HGB 11.2 (L) 12.0 - 16.0 g/dL    HCT 31.6 (L) 35.0 - 48.0 %    MCV 87.8 80.0 - 100.0 fL    MCH 31.1 26.0 - 34.0 pg    MCHC 35.4 31.0 - 37.0 g/dL     RDW-SD 45.9 35.1 - 46.3 fL    RDW 14.4 11.0 - 15.0 %    .0 (L) 150.0 - 450.0 10(3)uL    Neutrophil Absolute Prelim 8.87 (H) 1.50 - 7.70 x10 (3) uL    Neutrophil Absolute 8.87 (H) 1.50 - 7.70 x10(3) uL    Lymphocyte Absolute 2.05 1.00 - 4.00 x10(3) uL    Monocyte Absolute 0.82 0.10 - 1.00 x10(3) uL    Eosinophil Absolute 0.11 0.00 - 0.70 x10(3) uL    Basophil Absolute 0.03 0.00 - 0.20 x10(3) uL    Immature Granulocyte Absolute 0.07 0.00 - 1.00 x10(3) uL    Neutrophil % 74.1 %    Lymphocyte % 17.2 %    Monocyte % 6.9 %    Eosinophil % 0.9 %    Basophil % 0.3 %    Immature Granulocyte % 0.6 %     No results for input(s): \"ABORH\" in the last 72 hours.  Recent Labs     03/19/25  1819 03/21/25  0617   WBC 9.7 12.0*   HGB 12.8 11.2*   HCT 35.6 31.6*     No results found.    Complications: none       Discharge Plan:   Discharge Condition: stable     Discharge Meds:   Current Discharge Medication List        New Orders    Details   ibuprofen 200 MG Oral Tab Take 3 tablets (600 mg total) by mouth every 6 (six) hours as needed for Pain.           Home Meds - Modified    Details   acetaminophen 500 MG Oral Tab Take 1 tablet (500 mg total) by mouth every 6 (six) hours as needed for Pain.           Home Meds - Unchanged    Details   prenatal vitamin with DHA 27-0.8-228 MG Oral Cap Take 1 capsule by mouth daily.                   Discharge Activity: Pelvic rest until cleared  No heavy lifting >15 lbs  Abstain from sexual intercourse until cleared at 6 wks PP  No driving the first week and when on opiates  Activity as tolerated.    No exercise until cleared at 6 wks PP    Follow up:     Follow up in office: 6 weeks         Ashlyn Nicole MD  3/21/2025

## 2025-03-21 NOTE — PROGRESS NOTES
[facility]  part of [facility]    OB/Gyne Postpartum Progress Note      Lisa Gore Patient Status:  Inpatient    1995 MRN [id number]   Location [facility] 3SE Attending Bibi Garcia MD   Hosp Day # 2 PCP Unknown Pcp       Subjective      Patient feeling well.   Pain well controlled. Lochia appropriate.   Tolerating diet. Denies N/V  Ambulating. Spontaneously voiding. Passing flatus   Breastfeeding    Objective   Vital signs in last 24 hours:  Temp:  [97.7 °F (36.5 °C)-99.2 °F (37.3 °C)] 97.7 °F (36.5 °C)  Pulse:  [] 64  Resp:  [16-18] 16  BP: (104-131)/(50-86) 112/67  SpO2:  [97 %-100 %] 98 %    Input/Output:    Intake/Output Summary (Last 24 hours) at 3/21/2025 1625  Last data filed at 3/20/2025 2300  Gross per 24 hour   Intake 1097.35 ml   Output 1050 ml   Net 47.35 ml         Constitutional: comfortable  Pulm: non labored breathing  CV: regular rate  Abdomen: soft, nontender, nondistended  Uterus: fundus firm at umbilicus,   Extremities: No calf tenderness      Results:   Labs / Path / Radiology:    Recent Results (from the past 24 hours)   Fetal Screen    Collection Time: 25  7:27 PM   Result Value Ref Range    Fetal Screen Result Negative    CBC With Differential With Platelet    Collection Time: 25  6:17 AM   Result Value Ref Range    WBC 12.0 (H) 4.0 - 11.0 x10(3) uL    RBC 3.60 (L) 3.80 - 5.30 x10(6)uL    HGB 11.2 (L) 12.0 - 16.0 g/dL    HCT 31.6 (L) 35.0 - 48.0 %    MCV 87.8 80.0 - 100.0 fL    MCH 31.1 26.0 - 34.0 pg    MCHC 35.4 31.0 - 37.0 g/dL    RDW-SD 45.9 35.1 - 46.3 fL    RDW 14.4 11.0 - 15.0 %    .0 (L) 150.0 - 450.0 10(3)uL    Neutrophil Absolute Prelim 8.87 (H) 1.50 - 7.70 x10 (3) uL    Neutrophil Absolute 8.87 (H) 1.50 - 7.70 x10(3) uL    Lymphocyte Absolute 2.05 1.00 - 4.00 x10(3) uL    Monocyte Absolute 0.82 0.10 - 1.00 x10(3) uL    Eosinophil Absolute 0.11 0.00 - 0.70 x10(3) uL    Basophil Absolute 0.03 0.00 - 0.20 x10(3)  uL    Immature Granulocyte Absolute 0.07 0.00 - 1.00 x10(3) uL    Neutrophil % 74.1 %    Lymphocyte % 17.2 %    Monocyte % 6.9 %    Eosinophil % 0.9 %    Basophil % 0.3 %    Immature Granulocyte % 0.6 %       Recent Labs   Lab 25  1819 25  0617   RBC 4.11 3.60*   HGB 12.8 11.2*   HCT 35.6 31.6*   MCV 86.6 87.8   MCH 31.1 31.1   MCHC 36.0 35.4   RDW 14.0 14.4   NEPRELIM 6.94 8.87*   WBC 9.7 12.0*   .0 136.0*             Assessment/Plan   29 year oldyo  , s/p , postpartum day 2.      Postpartum:  -General diet, ambulating  -Voiding freely   -Meeting milestones    2. Baby boy:  Discussed risks of circumcision including bleeding, infection, damage to penis, need for circumcision revision. Consent signed    Discharge home today  Discussed restrictions, instructions, medications  F/U in office in 6 weeks       Ashlyn Nicole MD  3/21/2025  4:25 PM

## 2025-03-21 NOTE — ANESTHESIA POSTPROCEDURE EVALUATION
Patient: Lisa Gore    Procedure Summary       Date: 03/20/25 Room / Location:     Anesthesia Start: 1341 Anesthesia Stop: 1839    Procedure: LABOR ANALGESIA Diagnosis:     Scheduled Providers:  Anesthesiologist: Reynaldo Rivera DO    Anesthesia Type: epidural ASA Status: 2            Anesthesia Type: No value filed.    Vitals Value Taken Time   /73 03/20/25 1915   Temp 98.3 °F (36.8 °C) 03/20/25 1845   Pulse 86 03/20/25 1915   Resp 16 03/20/25 1845   SpO2 98 % 03/20/25 1915   Vitals shown include unfiled device data.    [facility] AN Post Evaluation:   Patient Evaluated in floor  Patient Participation: complete - patient participated  Level of Consciousness: awake and alert  Pain Management: adequate  Airway Patency:patent  Yes    Cardiovascular Status: acceptable  Respiratory Status: acceptable  Postoperative Hydration acceptable      Reynaldo Rivera DO  3/20/2025 7:17 PM

## 2025-03-21 NOTE — PLAN OF CARE
Problem: Patient/Family Goals  Goal: Patient/Family Long Term Goal  Description: Patient's Long Term Goal: Healthy postpartum progression  Interventions:-   - Lactation support as needed  - Timely assessments including early bilirubin testing for infant  - Pain control per MAR, discussed with patient  - Bleeding control  - Updated per patient and family preferences   - See additional Care Plan goals for specific interventions  Outcome: Progressing  Goal: Patient/Family Short Term Goal  Description: Patient's Short Term Goal: Home, preferably today, with healthy baby  Interventions:   - See above. See additional Care Plan goals for specific interventions  Outcome: Progressing     Problem: POSTPARTUM  Goal: Long Term Goal:Experiences normal postpartum course  Description: INTERVENTIONS:- Assess and monitor vital signs and lab values.- Assess fundus and lochia.- Provide ice/sitz baths for perineum discomfort.- Monitor healing of incision/episiotomy/laceration, and assess for signs and symptoms of infection and hematoma.- Assess bladder function and monitor for bladder distention.- Provide/instruct/assist with pericare as needed.- Provide VTE prophylaxis as needed.- Monitor bowel function.- Encourage ambulation and provide assistance as needed.- Assess and monitor emotional status and provide social service/psych resources as needed.- Utilize standard precautions and use personal protective equipment as indicated. Ensure aseptic care of all intravenous lines and invasive tubes/drains.- Obtain immunization and exposure to communicable diseases history.  Outcome: Progressing  Goal: Optimize infant feeding at the breast  Description: INTERVENTIONS:- Initiate breast feeding within first hour after birth. - Monitor effectiveness of current breast feeding efforts.- Assess support systems available to mother/family.- Identify cultural beliefs/practices regarding lactation, letdown techniques, maternal food preferences.-  Assess mother's knowledge and previous experience with breast feeding.- Provide information as needed about early infant feeding cues (e.g., rooting, lip smacking, sucking fingers/hand) versus late cue of crying.- Discuss/demonstrate breast feeding aids (e.g., infant sling, nursing footstool/pillows, and breast pumps).- Encourage mother/other family members to express feelings/concerns, and actively listen.- Educate father/SO about benefits of breast feeding and how to manage common lactation challenges.- Recommend avoidance of specific medications or substances incompatible with breast feeding.- Assess and monitor for signs of nipple pain/trauma.- Instruct and provide assistance with proper latch.- Review techniques for milk expression (breast pumping) and storage of breast milk. Provide pumping equipment/supplies, instructions and assistance, as needed.- Encourage rooming-in and breast feeding on demand.- Encourage skin-to-skin contact.- Provide LC support as needed.- Assess for and manage engorgement.- Provide breast feeding education handouts and information on community breast feeding support.   Outcome: Progressing  Goal: Establishment of adequate milk supply with medication/procedure interruptions  Description: INTERVENTIONS:- Review techniques for milk expression (breast pumping). - Provide pumping equipment/supplies, instructions, and assistance until it is safe to breastfeed infant.  Outcome: Progressing  Goal: Appropriate maternal -  bonding  Description: INTERVENTIONS:- Assess caregiver- interactions.- Assess caregiver's emotional status and coping mechanisms.- Encourage caregiver to participate in  daily care.- Assess support systems available to mother/family.- Provide /case management support as needed.  Outcome: Progressing

## 2025-03-21 NOTE — L&D DELIVERY NOTE
Abimael Gore [[id number]]      Labor Events     labor?: No   steroids?: None  Antibiotics received during labor?: No  Rupture date/time: 3/20/2025 1411     Rupture type: AROM  Fluid color: Clear  Labor type: Induced Onset of Labor  Induction: Oxytocin, Cervical Ripening Balloon, AROM  Indications for induction: Post-term Gestation  Intrapartum & labor complications: Variable decelerations       Labor Event Times    Labor onset date/time: 3/20/2025 0550  Dilation complete date/time: 3/20/2025 1822  Start pushing date/time: 3/20/2025 1828       [place] Presentation    Presentation: Vertex  Position: Occiput Anterior       Operative Delivery    Operative Vaginal Delivery: N/A                Shoulder Dystocia    Shoulder Dystocia: No       Anesthesia    Method: Epidural              [place] Delivery      Head delivery date/time: 3/20/2025 18:34:44   Delivery date/time:  3/20/25 18:35:03   Delivery type: Normal spontaneous vaginal delivery    Details:     Delivery location: delivery room  Delivery Room Temperature: 74       Delivery Providers    Delivering Clinician: Samantha Blanc MD   Delivery personnel:  Provider Role   Jose E Galarza, RN Baby Nurse   Chelle Quiñonez, RN Delivery Nurse   Wilma Yanes Surgical Tech             Cord    Vessels: 3 Vessels  Complications: None  Timed cord clamping: Yes  Time in sec: 60  Cord blood disposition: to lab  Gases sent?: No       Resuscitation    Method: None       [place] Measurements    No data filed       Placenta    Date/time: 3/20/2025 1839  Removal: Spontaneous  Appearance: Intact  Disposition: Discarded       Apgars    Living status: Living   Apgar Scoring Key:    0 1 2    Skin color Blue or pale Acrocyanotic Completely pink    Heart rate Absent <100 bpm >100 bpm    Reflex irritability No response Grimace Cry or active withdrawal    Muscle tone Limp Some flexion Active motion    Respiratory effort Absent Weak cry; hypoventilation Good, crying               1 Minute:  5 Minute:  10 Minute:  15 Minute:  20 Minute:      Skin color: 0  1       Heart rate: 2  2       Reflex irritablity: 2  2       Muscle tone: 2  2       Respiratory effort: 2  2       Total: 8  9          Apgars assigned by: CAT RENTERIA  [place] disposition: with mother       Skin to Skin    Skin to skin initiated date/time: 3/20/2025 1835  Skin to skin with: Mother       Vaginal Count    Initial count RN: Chelle Quiñonez RN  Initial count Tech: McDavis, Wilma   Sponges   Sharps    Initial counts 10   0    Final counts 10   0    Final count RN: Chelle Quiñonez RN  Final count MD: Samantha Blanc MD       Lacerations    Episiotomy: None  Perineal lacerations: None      Vaginal laceration?: No      Cervical laceration?: No    Clitoral laceration?: No    Quantitative blood loss (mL): 250              [facility]  part of [facility]    Vaginal Delivery Note    Lisa Gore Patient Status:  Inpatient    1995 MRN [id number]   Location [facility] Attending Bibi Garcia MD   Hosp Day # 1 PCP Unknown Pcp     Delivery     Diagnosis: Induction of labor    Labor Details: Induction    Procedure: spontaneous vaginal delivery    Neonatologist Present: no    Placenta  Date/Time of Delivery: 3/20/2025  6:39 PM   Delivery: spontaneous  Placenta to Pathology: no  Cord Gases Submitted: no    Cord Complications: none    Maternal Anesthesia: epidural   Episiotomy/Laceration Repair  Repair Comments: none needed    Sponge and Needle Counts:  Verified yes    Delivery Complications  none    Hemorrhage?: No, patient is stable, asymptomatic and blood loss is within expected amount following delivery. Standard treatment provided to prevent PPH. Patient does not meet ACOG criteria for hemorrhage at this time.    QBL: Quantitative Blood Loss (mL) 250       Delivery Comments: none    Samantha Blanc MD   3/20/2025  7:03 PM

## 2025-03-21 NOTE — PROGRESS NOTES
Patient up to bathroom with assist x 2. Unable to void at this time. Patient transferred to mother/baby room 361 per wheelchair in stable condition with baby and personal belongings. Accompanied by significant other and staff. Report given to Manuela Mother/Baby RN by PELON Dunn.

## 2025-03-22 NOTE — PLAN OF CARE
Discharge order received from MD. Postpartum instructions reviewed, all questions answered at this time. ID bands matched with baby bands. Patient informed when to make a follow-up appointment with OB. Verbalized understanding of follow-up instructions. Discharged to home in stable condition, declined wheelchair.

## (undated) NOTE — LETTER
[facility]  Administration of Anesthesia  Lisa CHERRY agree to be cared for by a physician anesthesiologist alone and/or with a nurse anesthetist, who is specially trained to monitor me and give me medicine to put me to sleep or keep me comfortable during my procedure    I understand that my anesthesiologist and/or anesthetist is not an employee or agent of [facility] or [facility]. He or she works for [facility]    As the patient asking for anesthesia services, I agree to:  Allow the anesthesiologist (anesthesia doctor) to give me medicine and do additional procedures as necessary. Some examples are: Starting or using an “IV” to give me medicine, fluids or blood during my procedure, and having a breathing tube placed to help me breathe when I’m asleep (intubation). In the event that my heart stops working properly, I understand that my anesthesiologist will make every effort to sustain my life, unless otherwise directed by [facility] Do Not Resuscitate documents.  Tell my anesthesia doctor before my procedure:  If I am pregnant.  The last time that I ate or drank.  iii. All of the medicines I take (including prescriptions, herbal supplements, and pills I can buy without a prescription (including street drugs/illegal medications). Failure to inform my anesthesiologist about these medicines may increase my risk of anesthetic complications.  iv.If I am allergic to anything or have had a reaction to anesthesia before.  I understand how the anesthesia medicine will help me (benefits).  I understand that with any type of anesthesia medicine there are risks:  The most common risks are: nausea, vomiting, sore throat, muscle soreness, damage to my eyes, mouth, or teeth (from breathing tube placement).  Rare risks include: remembering what happened during my procedure, allergic reactions to medications, injury to my airway, heart, lungs, vision, nerves, or muscles  and in extremely rare instances death.  My doctor has explained to me other choices available to me for my care (alternatives).  Pregnant Patients (“epidural”):  I understand that the risks of having an epidural (medicine given into my back to help control pain during labor), include itching, low blood pressure, difficulty urinating, headache or slowing of the baby’s heart. Very rare risks include infection, bleeding, seizure, irregular heart rhythms and nerve injury.  Regional Anesthesia (“spinal”, “epidural”, & “nerve blocks”):  I understand that rare but potential complications include headache, bleeding, infection, seizure, irregular heart rhythms, and nerve injury.    _____________________________________________________________________________  Patient (or Representative) Signature/Relationship to Patient  Date   Time    _____________________________________________________________________________   Name (if used)    Language/Organization   Time    _____________________________________________________________________________  Nurse Anesthetist Signature     Date   Time  _____________________________________________________________________________  Anesthesiologist Signature     Date   Time  I have discussed the procedure and information above with the patient (or patient’s representative) and answered their questions. The patient or their representative has agreed to have anesthesia services.    _____________________________________________________________________________  Witness        Date   Time  I have verified that the signature is that of the patient or patient’s representative, and that it was signed before the procedure  Patient Name: Lisa Gore     : 1995                 Printed: 3/19/2025 at 5:06 PM    Medical Record #: [id number]                                            Page 1 of 1  ----------ANESTHESIA CONSENT----------